# Patient Record
Sex: FEMALE | Race: WHITE | NOT HISPANIC OR LATINO | Employment: FULL TIME
[De-identification: names, ages, dates, MRNs, and addresses within clinical notes are randomized per-mention and may not be internally consistent; named-entity substitution may affect disease eponyms.]

---

## 2017-06-10 ENCOUNTER — HEALTH MAINTENANCE LETTER (OUTPATIENT)
Age: 72
End: 2017-06-10

## 2018-06-16 ENCOUNTER — HEALTH MAINTENANCE LETTER (OUTPATIENT)
Age: 73
End: 2018-06-16

## 2019-09-29 ENCOUNTER — HEALTH MAINTENANCE LETTER (OUTPATIENT)
Age: 74
End: 2019-09-29

## 2020-03-15 ENCOUNTER — HEALTH MAINTENANCE LETTER (OUTPATIENT)
Age: 75
End: 2020-03-15

## 2021-01-14 ENCOUNTER — HEALTH MAINTENANCE LETTER (OUTPATIENT)
Age: 76
End: 2021-01-14

## 2021-05-08 ENCOUNTER — HEALTH MAINTENANCE LETTER (OUTPATIENT)
Age: 76
End: 2021-05-08

## 2021-10-23 ENCOUNTER — HEALTH MAINTENANCE LETTER (OUTPATIENT)
Age: 76
End: 2021-10-23

## 2022-06-04 ENCOUNTER — HEALTH MAINTENANCE LETTER (OUTPATIENT)
Age: 77
End: 2022-06-04

## 2022-10-10 ENCOUNTER — HEALTH MAINTENANCE LETTER (OUTPATIENT)
Age: 77
End: 2022-10-10

## 2023-05-14 ENCOUNTER — TRANSFERRED RECORDS (OUTPATIENT)
Dept: HEALTH INFORMATION MANAGEMENT | Facility: CLINIC | Age: 78
End: 2023-05-14

## 2023-06-11 ENCOUNTER — HEALTH MAINTENANCE LETTER (OUTPATIENT)
Age: 78
End: 2023-06-11

## 2024-08-06 ENCOUNTER — TRANSFERRED RECORDS (OUTPATIENT)
Dept: HEALTH INFORMATION MANAGEMENT | Facility: CLINIC | Age: 79
End: 2024-08-06

## 2024-08-22 ENCOUNTER — TRANSFERRED RECORDS (OUTPATIENT)
Dept: HEALTH INFORMATION MANAGEMENT | Facility: CLINIC | Age: 79
End: 2024-08-22
Payer: COMMERCIAL

## 2024-09-10 ENCOUNTER — OFFICE VISIT (OUTPATIENT)
Dept: CARDIOLOGY | Facility: CLINIC | Age: 79
End: 2024-09-10
Payer: MEDICARE

## 2024-09-10 VITALS
BODY MASS INDEX: 21.05 KG/M2 | RESPIRATION RATE: 14 BRPM | DIASTOLIC BLOOD PRESSURE: 62 MMHG | HEART RATE: 68 BPM | WEIGHT: 131 LBS | SYSTOLIC BLOOD PRESSURE: 102 MMHG | HEIGHT: 66 IN

## 2024-09-10 DIAGNOSIS — I49.3 PVC'S (PREMATURE VENTRICULAR CONTRACTIONS): ICD-10-CM

## 2024-09-10 DIAGNOSIS — R68.89 EXERCISE INTOLERANCE: ICD-10-CM

## 2024-09-10 DIAGNOSIS — I49.1 PAC (PREMATURE ATRIAL CONTRACTION): ICD-10-CM

## 2024-09-10 DIAGNOSIS — Z87.74 STATUS POST REPAIR OF PATENT DUCTUS ARTERIOSUS: ICD-10-CM

## 2024-09-10 DIAGNOSIS — R06.09 DYSPNEA ON EXERTION: Primary | ICD-10-CM

## 2024-09-10 PROCEDURE — 99205 OFFICE O/P NEW HI 60 MIN: CPT | Performed by: INTERNAL MEDICINE

## 2024-09-10 RX ORDER — VITAMIN B COMPLEX
1 TABLET ORAL DAILY
COMMUNITY

## 2024-09-10 RX ORDER — ESTRADIOL 10 UG/1
10 INSERT VAGINAL
COMMUNITY
Start: 2024-08-31

## 2024-09-10 NOTE — PROGRESS NOTES
[facility]  [phone]          Assessment/Recommendations   Patient with a bothersome constellation of symptoms including shortness of breath with activity that was dramatic in 2023, abated and returned this summer.  She also has exercise intolerance and that she has reduced energy, some notice of palpitations/irregular heartbeat,.  Normal coronary arteries, normal left ventricular end-diastolic pressure, normal history of pediatric peptide in 2023.  Mild bronchospastic lung disease.    There is a dramatic difference and is difficult to pinpoint.  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  Would like to start with a cardiac MRI to see if there is any changes in her myocardium, evaluate the right ventricle, and evaluate for any other associated congenital abnormalities.  Patient is agreeable.    Might consider a very low level stress test with oxygen consumption thereafter if the MRI is is unremarkable.  This could allow us to see if her exercise intolerance is related to a pulmonary issue, cardiac issue, and oftentimes can help us decide about deconditioning as well.  Deconditioning seems unlikely given this fairly dramatic change in her functional capacity and her desire to be very active.    Will get back to her with the results of the cardiac MRI and further recommendations.  60 minutes spent with chart review, patient visit, and documentation.     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  She walked vigorously without limitations but in 2023, probably around April she started and noticed fairly dramatic shortness of breath.  She notices it on a single day and was evaluated by her internist and then of pulmonologist  who diagnosed some mild bronchospastic component and treated with a preexercise albuterol.  During that time she also had an extensive cardiac evaluation with an echocardiogram which was unremarkable, a coronary angiogram which showed normal coronary arteries with normal left ventricular end-diastolic pressure, and a normal B natruretic peptide.  They debated at that time whether to go on a trip but ended up going to [facility] and during the 2 weeks in [place] her symptoms dramatically improved.  She was using the preexercise albuterol inhaler.  The trip to [place] was in October 2023.  Upon return there was continued improvement in her symptoms and she was working up to her regular exercise of 4 miles of walking a day but in July of this year she noticed that her symptoms returned.  Symptoms were quite similar.  Fatigue and fairly dramatic shortness of breath with minimal activity.  She denies orthopnea, paroxysmal nocturnal dyspnea, peripheral edema, syncope or near syncopal episodes.  A monitor showed a very small amount of premature ventricular contractions and a very small amount of supraventricular extrasystolic beats.  Multiple blood evaluations have been unremarkable including thyroid function.  Hemoglobin has been normal although platelet count in early August was slightly diminished at 137,000.  TSH was slightly increased.  C-reactive protein was less than 0.5 in August.  LDL cholesterol in April 2023 was 84 on no treatment.    The patient does not have diabetes, has never smoked cigarettes, has not been treated for hyperlipidemia, does not have a family history of premature coronary artery disease and has not been treated for hypertension.    Patient grew up in [place].  She went undergraduate school at [employer] and received a PhD at [employer] in anthropology.  She is  and has 2 children.  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  Report reviewed but images not reviewed.  In May 2023 had possible left atrial enlargement, sinus bradycardia but no other changes were reported.    High-resolution CT in August of this year did not show any evidence of interstitial lung disease and minimal right basilar scarring.       Physical Examination Review of Systems   /62 (BP Location: Left arm, Patient Position: Sitting, Cuff Size: Adult Regular)   Pulse 68   Resp 14   Ht 1.664 m (5' 5.5\")   Wt 59.4 kg (131 lb)   BMI 21.47 kg/m    Body mass index is 21.47 kg/m .  Wt Readings from Last 3 Encounters:   09/10/24 59.4 kg (131 lb)   14 64.8 kg (142 lb 14.4 oz)   14 56.3 kg (124 lb 1.6 oz)     General Appearance:   Alert, cooperative and in no acute distress.   ENT/Mouth: Pink/moist oral mucosa   EYES:  no scleral icterus, normal conjunctivae   Neck: JVP normal. No Hepatojugular reflux. Thyroid not visualized.   Chest/Lungs:   Lungs are clear to auscultation, equal chest wall expansion.   Cardiovascular:   S1, S2 without murmur ,clicks or rubs. Brachial, radial and posterior tibial pulses are intact and symetric. No carotid bruits noted   Abdomen:  Nontender.    Extremities: No cyanosis, clubbing or edema   Skin: no xanthelasma, warm.    Neurologic: normal arm movement bilateral, no tremors     Psychiatric: Appropriate affect.      Enc Vitals  BP: 102/62  Pulse: 68  Resp: 14  Weight: 59.4 kg (131 lb)  Height: 166.4 cm (5' 5.5\")                                           Medical History  Surgical History Family History Social History   Past Medical History:   Diagnosis Date    Osteopenia 2012    No past surgical history on file. Family History   Problem Relation Age of Onset    Hypertension Father          at 91yr    Cancer Mother         lung cancer and then  of pancreatic cancer    Cancer Brother         lymphoma    Cancer Maternal Grandfather         pancreatic cancer    Social History     Socioeconomic " History    Marital status:      Spouse name: Not on file    Number of children: Not on file    Years of education: Not on file    Highest education level: Not on file   Occupational History    Not on file   Tobacco Use    Smoking status: Never    Smokeless tobacco: Never   Substance and Sexual Activity    Alcohol use: Yes     Alcohol/week: 3.3 standard drinks of alcohol     Types: 4 drink(s) per week    Drug use: No    Sexual activity: Yes     Partners: Male   Other Topics Concern    Not on file   Social History Narrative    Not on file     Social Determinants of Health     Financial Resource Strain: Not on file   Food Insecurity: Not on file   Transportation Needs: Not on file   Physical Activity: Not on file   Stress: Not on file   Social Connections: Not on file   Interpersonal Safety: Unknown (1/9/2024)    Received from [facility]    Humiliation, Afraid, Rape, and Kick questionnaire     Fear of Current or Ex-Partner: Not on file     Emotionally Abused: Not on file     Physically Abused: No     Sexually Abused: No   Housing Stability: Not on file          Medications  Allergies   Current Outpatient Medications   Medication Sig Dispense Refill    estradiol (VAGIFEM) 10 MCG TABS vaginal tablet Place 10 mcg vaginally twice a week.      omeprazole (PRILOSEC) 20 MG capsule Take 1 capsule by mouth daily. 30 capsule 11    Vitamin D3 (CHOLECALCIFEROL) 25 mcg (1000 units) tablet Take 1 tablet by mouth daily.      Allergies   Allergen Reactions    Codeine Sulfate     Penicillins          Lab Results    Chemistry/lipid CBC Cardiac Enzymes/BNP/TSH/INR   Lab Results   Component Value Date    CHOL 194 03/17/2010    HDL 76 03/17/2010    TRIG 76 03/17/2010    BUN 15 08/28/2014     08/28/2014    CO2 29 08/28/2014    Lab Results   Component Value Date    WBC 7.1 08/28/2014    HGB 12.6 08/28/2014    HCT 38.0 08/28/2014    MCV 92 08/28/2014     08/28/2014    Lab Results   Component Value Date    TSH 6.02 (H)  11/01/2007

## 2024-09-10 NOTE — LETTER
9/10/2024    Ambrosio ePrera MD  [address]  [place] [address]    RE: Tony Bach       Dear Colleague,     I had the pleasure of seeing Tony Bach in the [facility].      [facility]  [phone]          Assessment/Recommendations   Patient with a bothersome constellation of symptoms including shortness of breath with activity that was dramatic in 2023, abated and returned this summer.  She also has exercise intolerance and that she has reduced energy, some notice of palpitations/irregular heartbeat,.  Normal coronary arteries, normal left ventricular end-diastolic pressure, normal history of pediatric peptide in 2023.  Mild bronchospastic lung disease.    There is a dramatic difference and is difficult to pinpoint.  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  Would like to start with a cardiac MRI to see if there is any changes in her myocardium, evaluate the right ventricle, and evaluate for any other associated congenital abnormalities.  Patient is agreeable.    Might consider a very low level stress test with oxygen consumption thereafter if the MRI is is unremarkable.  This could allow us to see if her exercise intolerance is related to a pulmonary issue, cardiac issue, and oftentimes can help us decide about deconditioning as well.  Deconditioning seems unlikely given this fairly dramatic change in her functional capacity and her desire to be very active.    Will get back to her with the results of the cardiac MRI and further recommendations.  60 minutes spent with chart review, patient visit, and documentation.     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  She walked vigorously  without limitations but in 2023, probably around April she started and noticed fairly dramatic shortness of breath.  She notices it on a single day and was evaluated by her internist and then of pulmonologist who diagnosed some mild bronchospastic component and treated with a preexercise albuterol.  During that time she also had an extensive cardiac evaluation with an echocardiogram which was unremarkable, a coronary angiogram which showed normal coronary arteries with normal left ventricular end-diastolic pressure, and a normal B natruretic peptide.  They debated at that time whether to go on a trip but ended up going to [facility] and during the 2 weeks in [facility] her symptoms dramatically improved.  She was using the preexercise albuterol inhaler.  The trip to [facility] was in October 2023.  Upon return there was continued improvement in her symptoms and she was working up to her regular exercise of 4 miles of walking a day but in July of this year she noticed that her symptoms returned.  Symptoms were quite similar.  Fatigue and fairly dramatic shortness of breath with minimal activity.  She denies orthopnea, paroxysmal nocturnal dyspnea, peripheral edema, syncope or near syncopal episodes.  A monitor showed a very small amount of premature ventricular contractions and a very small amount of supraventricular extrasystolic beats.  Multiple blood evaluations have been unremarkable including thyroid function.  Hemoglobin has been normal although platelet count in early August was slightly diminished at 137,000.  TSH was slightly increased.  C-reactive protein was less than 0.5 in August.  LDL cholesterol in April 2023 was 84 on no treatment.    The patient does not have diabetes, has never smoked cigarettes, has not been treated for hyperlipidemia, does not have a family history of premature coronary artery disease and has not been treated for hypertension.    Patient grew up in [place].  She went undergraduate school at  "[employer] and received a PhD at [employer] in anthropology.  She is  and has 2 children.  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  Report reviewed but images not reviewed.  In May 2023 had possible left atrial enlargement, sinus bradycardia but no other changes were reported.    High-resolution CT in August of this year did not show any evidence of interstitial lung disease and minimal right basilar scarring.       Physical Examination Review of Systems   /62 (BP Location: Left arm, Patient Position: Sitting, Cuff Size: Adult Regular)   Pulse 68   Resp 14   Ht 1.664 m (5' 5.5\")   Wt 59.4 kg (131 lb)   BMI 21.47 kg/m    Body mass index is 21.47 kg/m .  Wt Readings from Last 3 Encounters:   09/10/24 59.4 kg (131 lb)   08/21/14 64.8 kg (142 lb 14.4 oz)   08/12/14 56.3 kg (124 lb 1.6 oz)     General Appearance:   Alert, cooperative and in no acute distress.   ENT/Mouth: Pink/moist oral mucosa   EYES:  no scleral icterus, normal conjunctivae   Neck: JVP normal. No Hepatojugular reflux. Thyroid not visualized.   Chest/Lungs:   Lungs are clear to auscultation, equal chest wall expansion.   Cardiovascular:   S1, S2 without murmur ,clicks or rubs. Brachial, radial and posterior tibial pulses are intact and symetric. No carotid bruits noted   Abdomen:  Nontender.    Extremities: No cyanosis, clubbing or edema   Skin: no xanthelasma, warm.    Neurologic: normal arm movement bilateral, no tremors     Psychiatric: Appropriate affect.      Enc Vitals  BP: 102/62  Pulse: 68  Resp: 14  Weight: 59.4 kg (131 lb)  Height: 166.4 cm (5' 5.5\")                                           Medical History  Surgical History Family History Social History   Past Medical History:   Diagnosis Date     Osteopenia 2/13/2012    No past surgical history on file. Family History   Problem Relation Age of Onset     Hypertension Father         "  at 91yr     Cancer Mother         lung cancer and then  of pancreatic cancer     Cancer Brother         lymphoma     Cancer Maternal Grandfather         pancreatic cancer    Social History     Socioeconomic History     Marital status:      Spouse name: Not on file     Number of children: Not on file     Years of education: Not on file     Highest education level: Not on file   Occupational History     Not on file   Tobacco Use     Smoking status: Never     Smokeless tobacco: Never   Substance and Sexual Activity     Alcohol use: Yes     Alcohol/week: 3.3 standard drinks of alcohol     Types: 4 drink(s) per week     Drug use: No     Sexual activity: Yes     Partners: Male   Other Topics Concern     Not on file   Social History Narrative     Not on file     Social Determinants of Health     Financial Resource Strain: Not on file   Food Insecurity: Not on file   Transportation Needs: Not on file   Physical Activity: Not on file   Stress: Not on file   Social Connections: Not on file   Interpersonal Safety: Unknown (2024)    Received from [facility]    Humiliation, Afraid, Rape, and Kick questionnaire      Fear of Current or Ex-Partner: Not on file      Emotionally Abused: Not on file      Physically Abused: No      Sexually Abused: No   Housing Stability: Not on file          Medications  Allergies   Current Outpatient Medications   Medication Sig Dispense Refill     estradiol (VAGIFEM) 10 MCG TABS vaginal tablet Place 10 mcg vaginally twice a week.       omeprazole (PRILOSEC) 20 MG capsule Take 1 capsule by mouth daily. 30 capsule 11     Vitamin D3 (CHOLECALCIFEROL) 25 mcg (1000 units) tablet Take 1 tablet by mouth daily.      Allergies   Allergen Reactions     Codeine Sulfate      Penicillins          Lab Results    Chemistry/lipid CBC Cardiac Enzymes/BNP/TSH/INR   Lab Results   Component Value Date    CHOL 194 2010    HDL 76 2010    TRIG 76 2010    BUN 15 2014    NA  139 08/28/2014    CO2 29 08/28/2014    Lab Results   Component Value Date    WBC 7.1 08/28/2014    HGB 12.6 08/28/2014    HCT 38.0 08/28/2014    MCV 92 08/28/2014     08/28/2014    Lab Results   Component Value Date    TSH 6.02 (H) 11/01/2007                                                Thank you for allowing me to participate in the care of your patient.      Sincerely,     Mario Marroquin MD     [facility]  cc:   Referred Self, MD  No address on file

## 2024-09-16 ENCOUNTER — TELEPHONE (OUTPATIENT)
Dept: CARDIOLOGY | Facility: CLINIC | Age: 79
End: 2024-09-16
Payer: MEDICARE

## 2024-09-16 NOTE — TELEPHONE ENCOUNTER
[facility]    Phone Message    May a detailed message be left on voicemail: yes     Reason for Call: Other: Pt would like a call back as she would like a call back as she has decided to not go through with he test      Action Taken: Other: Cardio    Travel Screening: Not Applicable     Date of Service:

## 2024-09-16 NOTE — TELEPHONE ENCOUNTER
"Dr Marroquin, See PC from patient not wanting to pursue the cardiac MRI recommended at recent visit. Any new orders at this time? -sea    =========  PC to patient to discuss recent recommendations for cardiac MRI. She has given it considerable thought, and discussed it with her spouse who was present at her recent visit, and states with all the different testing that has been done over the past 5 weeks she is not wanting to proceed with the MRI since it is \"unlikely\" to provide an answer for her symptoms. She is grateful to Dr Marroquin for the thorough discussion at her OV, but as he was \"not confident\" anything might be learned from the test she states it \"doesn't make sense to proceed\". She also doesn't feel her symptoms are significant enough to proceed. She is open to proceeding if he feels strongly that it should be completed, but didn't get that strong sense from him at her visit. Will fwd update to Dr Marroquin and call if needed with response or new recs, and patient will call with update if her symptoms increase or has concerns.   -sea  "

## 2024-09-20 NOTE — TELEPHONE ENCOUNTER
Patient notified of comments per Dr Marroquin. She states has a lot of respect for Dr Marroquin and based on this further explanation she states is open to the cardiac MRI. Patient verbalizes understanding and agrees with plan. No further questions or concerns at this time. Scheduling contact provided if needed. -sea

## 2024-09-20 NOTE — TELEPHONE ENCOUNTER
Called patient-- unable to reach. Left message for patient to return call to discuss recommendations. -sea     ==========  ----- Message -----  From: Mario Marroquin MD  Sent: 9/20/2024  10:03 AM CDT  To: Emilia Waters RN    Emilia,    My sense in the clinic is that her symptoms were fairly dramatic and frustrating for her.  I certainly would not demand the MRI but would nudge her towards getting it to rule out any congenital abnormalities or infiltrative cardiomyopathy.    Thanks,    Mario

## 2024-10-13 ENCOUNTER — HEALTH MAINTENANCE LETTER (OUTPATIENT)
Age: 79
End: 2024-10-13

## 2025-02-22 ENCOUNTER — HEALTH MAINTENANCE LETTER (OUTPATIENT)
Age: 80
End: 2025-02-22